# Patient Record
Sex: FEMALE | Race: WHITE | URBAN - METROPOLITAN AREA
[De-identification: names, ages, dates, MRNs, and addresses within clinical notes are randomized per-mention and may not be internally consistent; named-entity substitution may affect disease eponyms.]

---

## 2019-10-10 ENCOUNTER — DOCUMENTATION ONLY (OUTPATIENT)
Dept: OPHTHALMOLOGY | Facility: CLINIC | Age: 74
End: 2019-10-10

## 2020-06-01 ENCOUNTER — HOSPITAL ENCOUNTER (OUTPATIENT)
Dept: HOSPITAL 11 - JP.SDS | Age: 75
Discharge: HOME | End: 2020-06-01
Attending: SURGERY
Payer: MEDICARE

## 2020-06-01 VITALS — HEART RATE: 56 BPM | DIASTOLIC BLOOD PRESSURE: 62 MMHG | SYSTOLIC BLOOD PRESSURE: 96 MMHG

## 2020-06-01 DIAGNOSIS — Z12.11: Primary | ICD-10-CM

## 2020-06-01 DIAGNOSIS — D12.8: ICD-10-CM

## 2020-06-01 DIAGNOSIS — Z80.8: ICD-10-CM

## 2020-06-01 DIAGNOSIS — K21.9: ICD-10-CM

## 2020-06-01 PROCEDURE — 88305 TISSUE EXAM BY PATHOLOGIST: CPT

## 2020-06-01 PROCEDURE — 45380 COLONOSCOPY AND BIOPSY: CPT

## 2020-06-01 NOTE — OR
DATE OF PROCEDURE:  06/01/2020

 

SURGEON:  Elieser Jj MD

 

PROCEDURE:  Colonoscopy.

 

FINDINGS:

1. Cecal polyp, approximately 5 mm, completely removed using cold biopsy forceps.

2. Descending colon polyp, approximately 5 mm, completely removed using cold biopsy

    forceps.

 

PREOPERATIVE DIAGNOSIS:  Family history of colorectal cancer.

 

POSTOPERATIVE DIAGNOSIS:  Family history of colorectal cancer.

 

RISKS:  Risks, benefits, alternatives, and limitations including, but not limited to

infection, bleeding, and perforation were explained to the patient, who wished to proceed.

 

PROCEDURE IN DETAIL:  The patient was placed in left lateral decubitus position.  Digital

rectal exam was performed.

 

Scope was introduced and advanced atraumatically to the ileocecal valve.  A photo was taken

of this.

 

Scope was brought back through the ascending, transverse, descending colon, and retroflexed.

 

The aforementioned polyps were identified and completely removed using cold biopsy forceps.

 

No diverticulosis.

 

No old or new blood.

 

The patient tolerated the procedure well.

 

 

 

 

Elieser Jj MD

DD:  06/01/2020 08:39:23

DT:  06/01/2020 09:03:24

Job #:  [id number]

## 2022-08-02 ENCOUNTER — HOSPITAL ENCOUNTER (OUTPATIENT)
Dept: HOSPITAL 11 - JP.SDS | Age: 77
Discharge: HOME | End: 2022-08-02
Attending: FAMILY MEDICINE
Payer: MEDICARE

## 2022-08-02 VITALS — SYSTOLIC BLOOD PRESSURE: 109 MMHG | DIASTOLIC BLOOD PRESSURE: 55 MMHG | HEART RATE: 64 BPM

## 2022-08-02 DIAGNOSIS — Z12.11: Primary | ICD-10-CM

## 2022-08-02 DIAGNOSIS — Z80.0: ICD-10-CM

## 2022-08-02 DIAGNOSIS — D12.0: ICD-10-CM

## 2022-08-02 PROCEDURE — 45380 COLONOSCOPY AND BIOPSY: CPT

## 2022-08-02 PROCEDURE — 88305 TISSUE EXAM BY PATHOLOGIST: CPT
